# Patient Record
Sex: FEMALE | Race: WHITE | NOT HISPANIC OR LATINO | URBAN - METROPOLITAN AREA
[De-identification: names, ages, dates, MRNs, and addresses within clinical notes are randomized per-mention and may not be internally consistent; named-entity substitution may affect disease eponyms.]

---

## 2018-12-03 ENCOUNTER — TRANSFERRED RECORDS (OUTPATIENT)
Dept: HEALTH INFORMATION MANAGEMENT | Facility: CLINIC | Age: 33
End: 2018-12-03

## 2019-10-07 ENCOUNTER — MEDICAL CORRESPONDENCE (OUTPATIENT)
Dept: HEALTH INFORMATION MANAGEMENT | Facility: CLINIC | Age: 34
End: 2019-10-07

## 2019-10-07 ENCOUNTER — TRANSFERRED RECORDS (OUTPATIENT)
Dept: HEALTH INFORMATION MANAGEMENT | Facility: CLINIC | Age: 34
End: 2019-10-07

## 2019-10-08 NOTE — TELEPHONE ENCOUNTER
FUTURE VISIT INFORMATION      FUTURE VISIT INFORMATION:    Date: 11/1/2019    Time: 3:00 PM- Audiology scheduled at 2:00 PM    Location: [facility]   REFERRAL INFORMATION:    Referring provider:  Esperanza Conde    Referring providers clinic:  [facility]    Reason for visit/diagnosis  Eustachian Tube Dysfunction     RECORDS REQUESTED FROM:       Clinic name Comments Records Status Imaging Status   [facility] 10/7/19 Office visit with Dr. Conde, Referral  Care Everywhere/ Scanned in [facility]NT *office notes received did not pertain DX* Requested- Received and sent to scan 10/25/19                                10/8/19 Follow up to see if referral and med recs will be received; if not, will send out request. -Bhao     *Follow up with Pt to inquire about outside med recs. -Bhao    10/11/19 10:20am Called Pt and LVM for call back; called to inquire about any outside med recs. -Bhao     10/18/19 8:49am Called and spoke with Pt; Pt noted that she has been seen at [facility]. Sent an ADAM for Pt [email or web address]) to fill out and then will request for med recs from [facility]. -Bhao    10/21/19 8:17am Received ADAM from Pt via email; fax request sent to [facility] ([phone]) at 9:11am. -Bhao     10/23/19 11:00am 2nd fax request sent to [facility]. -Bhao     10/25/19 11:46am Called and LVM for Sushant; follow up on request sent. -Bhao     10/25/19 12:31pm Received office notes from [facility] however, the offices notes did not pertain to Pt's DX. Notes sent to scan. -Bhao

## 2019-11-01 ENCOUNTER — PRE VISIT (OUTPATIENT)
Dept: OTOLARYNGOLOGY | Facility: CLINIC | Age: 34
End: 2019-11-01

## 2019-11-01 ENCOUNTER — OFFICE VISIT (OUTPATIENT)
Dept: AUDIOLOGY | Facility: CLINIC | Age: 34
End: 2019-11-01
Payer: COMMERCIAL

## 2019-11-01 ENCOUNTER — OFFICE VISIT (OUTPATIENT)
Dept: OTOLARYNGOLOGY | Facility: CLINIC | Age: 34
End: 2019-11-01
Payer: COMMERCIAL

## 2019-11-01 VITALS
DIASTOLIC BLOOD PRESSURE: 98 MMHG | SYSTOLIC BLOOD PRESSURE: 138 MMHG | HEART RATE: 102 BPM | RESPIRATION RATE: 16 BRPM | BODY MASS INDEX: 43.8 KG/M2 | TEMPERATURE: 98.1 F | WEIGHT: 232 LBS | HEIGHT: 61 IN

## 2019-11-01 DIAGNOSIS — H69.92 DYSFUNCTION OF LEFT EUSTACHIAN TUBE: Primary | ICD-10-CM

## 2019-11-01 DIAGNOSIS — M26.609 TMJ (TEMPOROMANDIBULAR JOINT SYNDROME): ICD-10-CM

## 2019-11-01 DIAGNOSIS — H91.90 HEARING LOSS, UNSPECIFIED HEARING LOSS TYPE, UNSPECIFIED LATERALITY: Primary | ICD-10-CM

## 2019-11-01 DIAGNOSIS — H91.90 HEARING LOSS, UNSPECIFIED HEARING LOSS TYPE, UNSPECIFIED LATERALITY: ICD-10-CM

## 2019-11-01 DIAGNOSIS — H92.03 OTALGIA OF BOTH EARS: Primary | ICD-10-CM

## 2019-11-01 DIAGNOSIS — G47.33 OSA (OBSTRUCTIVE SLEEP APNEA): ICD-10-CM

## 2019-11-01 RX ORDER — NORGESTIMATE AND ETHINYL ESTRADIOL 7DAYSX3 28
1 KIT ORAL
COMMUNITY
Start: 2019-10-07

## 2019-11-01 RX ORDER — AMITRIPTYLINE HYDROCHLORIDE 50 MG/1
TABLET ORAL
Refills: 3 | COMMUNITY
Start: 2019-10-30

## 2019-11-01 RX ORDER — SUMATRIPTAN 25 MG/1
TABLET, FILM COATED ORAL
COMMUNITY
Start: 2019-04-26

## 2019-11-01 ASSESSMENT — PAIN SCALES - GENERAL: PAINLEVEL: NO PAIN (0)

## 2019-11-01 ASSESSMENT — MIFFLIN-ST. JEOR: SCORE: 1700.11

## 2019-11-01 NOTE — NURSING NOTE
"Chief Complaint   Patient presents with     Consult     left eustachain tube dysfunction      Blood pressure (!) 138/98, pulse 102, temperature 98.1  F (36.7  C), resp. rate 16, height 1.55 m (5' 1.02\"), weight 105.2 kg (232 lb).    Wilver Henderson LPN\    "

## 2019-11-01 NOTE — PROGRESS NOTES
The patient presents with a history of hearing loss and eustachian tube dysfunction in the ears bilaterally. She reports events of severe pain in both ears.  Her Audiogram and Tympanogram are reviewed with her and they demonstrate normal hearing bilaterally with 100% word recognition scores and normal tympanogram. The patient denies sinusitis, rhinitis, nasal obstruction or purulent nasal discharge. The patient denies chronic or recurrent tonsillitis, chronic or recurrent pharyngitis. The patient denies otalgia, otorrhea, eustachian tube dysfunction, ear infections, dizziness or tinnitus. The patient also reports symptoms of obstructive sleep apnea including snoring.     This patient is seen in consultation at the request of Dr. Esperanza Conde.     All other systems were reviewed and they are either negative or they are not directly pertinent to this Otolaryngology examination.      Past Medical History:    No past medical history on file.    Past Surgical History:    No past surgical history on file.    Medications:    No current outpatient medications on file.    Allergies:    Seasonal allergies    Physical Examination:    The patient is a well developed, well nourished female in no apparent distress.  She is normocephalic, atraumatic with pupils equally round and reactive to light.    Oral Cavity Examination:  Normal mucosa with no masses or lesions  Nasal Examination: Normal mucosa with no masses or lesions  Ear Examination: Ear canals clear, tympanic membranes and middle ear spaces normal  Neurological Examination: Facial nerve function intact and symmetric  Integumentary Examination: No lesions on the skin of the head and neck  Neck Examination: No masses or lesions, no lymphadenopathy  Endocrine Examination: Normal thyroid examination  Temporomandibular Joint Examination: Malrotation of the temporomandibular joints bilaterally.     Assessment and Plan:    The patient presents with a history of hearing loss and  eustachian tube dysfunction. Her Audiogram and Tympanogram are normal and she appears to have temporomandibular joint disorder. She will be referred to our Dental specialist for further evaluation and management of temporomandibular joint disorder. She will be referred for a polysomnogram to assess for obstructive sleep apnea.     CC: Dr. Esperanza Conde

## 2019-11-01 NOTE — LETTER
11/1/2019       RE: Shereen Adorno  [address]  [place] [facility] [address]     Dear Colleague,    Thank you for referring your patient, Shereen Adorno, to the [facility] EAR [facility]AT at [facility]. Please see a copy of my visit note below.    The patient presents with a history of hearing loss and eustachian tube dysfunction in the ears bilaterally. She reports events of severe pain in both ears.  Her Audiogram and Tympanogram are reviewed with her and they demonstrate normal hearing bilaterally with 100% word recognition scores and normal tympanogram. The patient denies sinusitis, rhinitis, nasal obstruction or purulent nasal discharge. The patient denies chronic or recurrent tonsillitis, chronic or recurrent pharyngitis. The patient denies otalgia, otorrhea, eustachian tube dysfunction, ear infections, dizziness or tinnitus. The patient also reports symptoms of obstructive sleep apnea including snoring.     This patient is seen in consultation at the request of Dr. Esperanza Conde.     All other systems were reviewed and they are either negative or they are not directly pertinent to this Otolaryngology examination.      Past Medical History:    No past medical history on file.    Past Surgical History:    No past surgical history on file.    Medications:    No current outpatient medications on file.    Allergies:    Seasonal allergies    Physical Examination:    The patient is a well developed, well nourished female in no apparent distress.  She is normocephalic, atraumatic with pupils equally round and reactive to light.    Oral Cavity Examination:  Normal mucosa with no masses or lesions  Nasal Examination: Normal mucosa with no masses or lesions  Ear Examination: Ear canals clear, tympanic membranes and middle ear spaces normal  Neurological Examination: Facial nerve function intact and symmetric  Integumentary Examination: No lesions on the skin of the head and neck  Neck  Examination: No masses or lesions, no lymphadenopathy  Endocrine Examination: Normal thyroid examination  Temporomandibular Joint Examination: Malrotation of the temporomandibular joints bilaterally.     Assessment and Plan:    The patient presents with a history of hearing loss and eustachian tube dysfunction. Her Audiogram and Tympanogram are normal and she appears to have temporomandibular joint disorder. She will be referred to our Dental specialist for further evaluation and management of temporomandibular joint disorder. She will be referred for a polysomnogram to assess for obstructive sleep apnea.     CC: Dr. Esperanza Conde    Again, thank you for allowing me to participate in the care of your patient.      Sincerely,    Mohsen Ivory MD

## 2019-11-01 NOTE — PATIENT INSTRUCTIONS
1.  You were seen in the ENT Clinic today by Dr. Ivory.  If you have any questions or concerns after your appointment, please call [phone]. Press option #1 for scheduling related needs. Press option #3 for Nurse advice.    2.  Please schedule an appointment for the following:   - Dental - TMJ Consultation   - Sleep Medicine - Sleep Study    3.  Plan is to return to clinic as needed.      Alisa Gillette LPN  [facility] Otolaryng[facility]  [phone]    The patient presents with a history of hearing loss and eustachian tube dysfunction. Her Audiogram and Tympanogram are normal and she appears to have temporomandibular joint disorder. She will be referred to our Dental specialist for further evaluation and management of temporomandibular joint disorder. She will be referred for a polysomnogram to assess for obstructive sleep apnea.

## 2019-11-01 NOTE — PROGRESS NOTES
AUDIOLOGY REPORT    SUMMARY: Audiology visit completed. See audiogram for results.      RECOMMENDATIONS: Follow-up with ENT.      Pravin Ariza.  Licensed Audiologist  [place] #[id number]

## 2019-12-02 ENCOUNTER — MEDICAL CORRESPONDENCE (OUTPATIENT)
Dept: HEALTH INFORMATION MANAGEMENT | Facility: CLINIC | Age: 34
End: 2019-12-02